# Patient Record
Sex: FEMALE | Employment: FULL TIME | URBAN - METROPOLITAN AREA
[De-identification: names, ages, dates, MRNs, and addresses within clinical notes are randomized per-mention and may not be internally consistent; named-entity substitution may affect disease eponyms.]

---

## 2018-02-06 ENCOUNTER — OFFICE VISIT (OUTPATIENT)
Dept: SURGERY | Facility: CLINIC | Age: 38
End: 2018-02-06

## 2018-02-06 VITALS
HEART RATE: 95 BPM | SYSTOLIC BLOOD PRESSURE: 193 MMHG | HEIGHT: 70 IN | WEIGHT: 220 LBS | TEMPERATURE: 99 F | DIASTOLIC BLOOD PRESSURE: 116 MMHG | BODY MASS INDEX: 31.5 KG/M2

## 2018-02-06 DIAGNOSIS — K64.4 EXTERNAL PROLAPSED HEMORRHOIDS: ICD-10-CM

## 2018-02-06 DIAGNOSIS — D21.5: ICD-10-CM

## 2018-02-06 DIAGNOSIS — K64.2 PROLAPSED INTERNAL HEMORRHOIDS, GRADE 3: Primary | ICD-10-CM

## 2018-02-06 PROCEDURE — 46600 DIAGNOSTIC ANOSCOPY SPX: CPT | Performed by: COLON & RECTAL SURGERY

## 2018-02-06 PROCEDURE — 99244 OFF/OP CNSLTJ NEW/EST MOD 40: CPT | Performed by: COLON & RECTAL SURGERY

## 2018-02-06 NOTE — H&P
New Patient Visit Note       Active Problems      1. Prolapsed internal hemorrhoids, grade 3    2. External prolapsed hemorrhoids    3.  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 Greater than half of our visit was spent in counseling the patient on the above listed diagnoses and treatment options. Allergies  Alexandria Pina has No Known Allergies.     Past Medical / Surgical / Social / Family History    The past medical and pa disturbance.        Physical Findings   BP (!) 193/116   Pulse 95   Temp 98.8 °F (37.1 °C) (Oral)   Ht 70\"   Wt 220 lb   LMP 01/17/2018 (Approximate)   BMI 31.57 kg/m²   Physical Exam   Genitourinary: Vagina normal. Rectal exam shows internal hemorrhoid, m [facility]. The patient states that she had a \"nevus\" identified by Dr. Nemesio Mosqueda. She also had a \"anal tag\" and \"lump on the left buttock\".     She unfortunately did not bring her referral note fro hemorrhoids in the left lateral and right posterior lateral positions. There are no fissures or fistulae. There is no evidence of abscess. She does have a moderate anterior rectocele. She has no evidence of Crohn's disease or proctitis.   There are no m Osmani Vann MD

## 2018-02-09 PROBLEM — K64.4 EXTERNAL PROLAPSED HEMORRHOIDS: Status: ACTIVE | Noted: 2018-02-09

## 2018-02-09 PROBLEM — K64.2 PROLAPSED INTERNAL HEMORRHOIDS, GRADE 3: Status: ACTIVE | Noted: 2018-02-09

## 2018-02-09 PROBLEM — D21.5: Status: ACTIVE | Noted: 2018-02-09

## 2018-02-09 NOTE — PATIENT INSTRUCTIONS
I am seeing this patient in consultation from [facility], dermatology. The patient states that she had a \"nevus\" identified by Dr. Rafaela Diaz. She also had a \"anal tag\" and \"lump on the left buttock\".     She unfortunately did not bring her referral not 3 hemorrhoids in the left lateral and right posterior lateral positions. There are no fissures or fistulae. There is no evidence of abscess. She does have a moderate anterior rectocele. She has no evidence of Crohn's disease or proctitis.   There are no

## 2018-02-09 NOTE — PROCEDURES
Procedure:  Anoscopy    Surgeon: Danielle Sykes    Anesthesia: None    Findings: See the progress note attached for all findings    Operative Summary: The patient was placed in a prone position on the proctoscopy table, the hips were flexed in the jackknife p

## 2018-02-23 ENCOUNTER — LABORATORY ENCOUNTER (OUTPATIENT)
Dept: LAB | Age: 38
End: 2018-02-23
Attending: COLON & RECTAL SURGERY
Payer: COMMERCIAL

## 2018-02-23 DIAGNOSIS — M79.9 SOFT TISSUE LESION: Primary | ICD-10-CM

## 2018-02-23 PROCEDURE — 88304 TISSUE EXAM BY PATHOLOGIST: CPT

## 2018-02-23 PROCEDURE — 88305 TISSUE EXAM BY PATHOLOGIST: CPT

## 2018-03-13 ENCOUNTER — OFFICE VISIT (OUTPATIENT)
Dept: SURGERY | Facility: CLINIC | Age: 38
End: 2018-03-13

## 2018-03-13 VITALS — WEIGHT: 220 LBS | HEIGHT: 70 IN | BODY MASS INDEX: 31.5 KG/M2 | TEMPERATURE: 98 F

## 2018-03-13 DIAGNOSIS — D21.5: ICD-10-CM

## 2018-03-13 DIAGNOSIS — K64.2 PROLAPSED INTERNAL HEMORRHOIDS, GRADE 3: ICD-10-CM

## 2018-03-13 DIAGNOSIS — K64.4 EXTERNAL PROLAPSED HEMORRHOIDS: Primary | ICD-10-CM

## 2018-03-13 PROCEDURE — 99024 POSTOP FOLLOW-UP VISIT: CPT | Performed by: PHYSICIAN ASSISTANT

## 2018-03-13 NOTE — PROGRESS NOTES
Post Operative Visit Note       Active Problems  1. External prolapsed hemorrhoids    2. Prolapsed internal hemorrhoids, grade 3    3.  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 Review of Systems  The Review of Systems has been reviewed by me during today.   Rev site in the right anterolateral position is well-healed. 1 Vicryl suture is extruding. This is trimmed at the level of the skin. There is some minor tenderness around this incision, but no bleeding. Digital rectal exam is performed.   No palpable large

## (undated) NOTE — LETTER
18    Patient: Wild Mckinley  : 1980 Visit date: 2018    Dear  Dr. Nemesio Mosqueda MD,    Thank you for referring [place] [facility] to my practice. Please find my assessment and plan below.                 Assessment   Prolapsed internal hemorrhoid dimension. It is soft. There is no overlying erythema or cellulitis. There is no apparent drainage site. I do not see any suspicious skin mole or nevus on the buttocks, gluteal cleft, anal margin, or anal verge.     Digital rectal exam including anoscop I have asked the patient to produce the note or written prescription/letter from Dr. Yohannes Foreman. I need to be certain that there is no skin mole or nevus identified on her exam that was not seen at today's visit.   I am asking my nurse to obtain the note from sumit